# Patient Record
Sex: FEMALE | Race: WHITE | NOT HISPANIC OR LATINO | URBAN - METROPOLITAN AREA
[De-identification: names, ages, dates, MRNs, and addresses within clinical notes are randomized per-mention and may not be internally consistent; named-entity substitution may affect disease eponyms.]

---

## 2020-10-16 ENCOUNTER — IMPORTED ENCOUNTER (OUTPATIENT)
Dept: URBAN - METROPOLITAN AREA CLINIC 50 | Facility: CLINIC | Age: 60
End: 2020-10-16

## 2020-10-19 ENCOUNTER — IMPORTED ENCOUNTER (OUTPATIENT)
Dept: URBAN - METROPOLITAN AREA CLINIC 50 | Facility: CLINIC | Age: 60
End: 2020-10-19

## 2020-10-26 ENCOUNTER — IMPORTED ENCOUNTER (OUTPATIENT)
Dept: URBAN - METROPOLITAN AREA CLINIC 50 | Facility: CLINIC | Age: 60
End: 2020-10-26

## 2020-10-26 PROBLEM — Z98.890: Noted: 2020-10-26

## 2020-10-29 NOTE — PATIENT DISCUSSION
New Prescription: erythromycin (erythromycin): ointment: 5 mg/gram (0.5 %) a small amount twice a day as directed on eyelid 10-

## 2020-10-29 NOTE — PATIENT DISCUSSION
LESION GREGG: I have discussed options with the patient, surgery versus follow. The risks, benefits, alternatives and alternatives include anesthesia, bleeding, infection, inflammation, swelling, bruising. The patient understands and desires to remove lesion. The lesion will be sent to pathology to rule out cancer. An RX was given for Erythromycin ointment to be applied to the affected area twice a day until resolved.

## 2020-11-02 ENCOUNTER — IMPORTED ENCOUNTER (OUTPATIENT)
Dept: URBAN - METROPOLITAN AREA CLINIC 50 | Facility: CLINIC | Age: 60
End: 2020-11-02

## 2020-11-02 PROBLEM — Z98.890: Noted: 2020-11-02

## 2020-11-02 PROBLEM — Z98.890: Noted: 2020-10-26

## 2020-11-09 ENCOUNTER — IMPORTED ENCOUNTER (OUTPATIENT)
Dept: URBAN - METROPOLITAN AREA CLINIC 50 | Facility: CLINIC | Age: 60
End: 2020-11-09

## 2021-04-17 ASSESSMENT — VISUAL ACUITY
OD_CC: J1+
OD_SC: 20/60-
OS_CC: J1+
OD_CC: J5
OS_PH: 20/30
OS_PH: 20/30
OD_PH: 20/30
OS_CC: 20/25
OS_PH: 20/20
OD_CC: 20/20
OS_CC: J1+@ 17 IN
OS_CC: J5
OS_CC: 20/60-
OS_SC: 20/150
OD_CC: J1+@ 17 IN
OS_CC: 20/150

## 2021-04-17 ASSESSMENT — TONOMETRY
OD_IOP_MMHG: 16
OS_IOP_MMHG: 17
OS_IOP_MMHG: 13
OD_IOP_MMHG: 13
OS_IOP_MMHG: 16
OS_IOP_MMHG: 17
OS_IOP_MMHG: 16
OD_IOP_MMHG: 15
OD_IOP_MMHG: 17
OD_IOP_MMHG: 16
OD_IOP_MMHG: 18

## 2021-10-28 ENCOUNTER — PREPPED CHART (OUTPATIENT)
Dept: URBAN - METROPOLITAN AREA CLINIC 53 | Facility: CLINIC | Age: 61
End: 2021-10-28

## 2021-11-08 ENCOUNTER — ANNUAL COMPREHENSIVE EXAM (OUTPATIENT)
Dept: URBAN - METROPOLITAN AREA CLINIC 53 | Facility: CLINIC | Age: 61
End: 2021-11-08

## 2021-11-08 DIAGNOSIS — H43.813: ICD-10-CM

## 2021-11-08 DIAGNOSIS — H25.13: ICD-10-CM

## 2021-11-08 DIAGNOSIS — H40.032: ICD-10-CM

## 2021-11-08 PROCEDURE — 92015NC REFRACTION NO CHARGE

## 2021-11-08 PROCEDURE — 92014 COMPRE OPH EXAM EST PT 1/>: CPT

## 2021-11-08 ASSESSMENT — VISUAL ACUITY
OD_GLARE: 20/30
OD_GLARE: 20/25
OU_CC: J2
OD_CC: SCL 20/25

## 2021-11-08 ASSESSMENT — TONOMETRY
OS_IOP_MMHG: 16
OD_IOP_MMHG: 17